# Patient Record
(demographics unavailable — no encounter records)

---
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
## 2025-05-06 NOTE — PHYSICAL EXAM
[de-identified] : TBSA 2% Right upper extremity - skin graft on hand and forearm is adherent and healing well. There are small open wounds at the edges of the graft. Mild maceration of skin on thumb. No evidence of infection right thigh donor site is healing appropriately, almost completely closed
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
## 2025-05-06 NOTE — ASSESSMENT
[FreeTextEntry1] : 4/29/25 Clean with soap and water Continue with Adaptic, harriett, ACE daily - bacitracin on small open areas Change daily Donor site - duoderm replaced, suspect will be closed after removal in 5 days Moisturize BID if closed Discussed importance of sun protection Follow up in 1w [Wound Care] : wound care [Sun Protection] : sun protection

## 2025-05-06 NOTE — PHYSICAL EXAM
[de-identified] : TBSA 2% Right upper extremity - skin graft on hand and forearm is adherent and healing well. There are small open wounds at the edges of the graft. Mild maceration of skin on thumb. No evidence of infection right thigh donor site is healing appropriately, almost completely closed
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
